# Patient Record
Sex: FEMALE | Race: WHITE | NOT HISPANIC OR LATINO | URBAN - METROPOLITAN AREA
[De-identification: names, ages, dates, MRNs, and addresses within clinical notes are randomized per-mention and may not be internally consistent; named-entity substitution may affect disease eponyms.]

---

## 2020-03-20 ENCOUNTER — IMPORTED ENCOUNTER (OUTPATIENT)
Dept: URBAN - METROPOLITAN AREA CLINIC 50 | Facility: CLINIC | Age: 25
End: 2020-03-20

## 2021-01-06 ENCOUNTER — IMPORTED ENCOUNTER (OUTPATIENT)
Dept: URBAN - METROPOLITAN AREA CLINIC 50 | Facility: CLINIC | Age: 26
End: 2021-01-06

## 2021-04-17 ASSESSMENT — VISUAL ACUITY
OS_CC: 20/25
OD_CC: 20/20-1

## 2021-04-17 ASSESSMENT — TONOMETRY
OD_IOP_MMHG: 18
OS_IOP_MMHG: 16

## 2021-06-02 ENCOUNTER — PREPPED CHART (OUTPATIENT)
Dept: URBAN - METROPOLITAN AREA CLINIC 50 | Facility: CLINIC | Age: 26
End: 2021-06-02

## 2021-06-02 ASSESSMENT — VISUAL ACUITY
OD_SC: 20/300
OD_CC: 20/20
OS_SC: 20/150
OS_CC: 20/25

## 2021-06-03 ENCOUNTER — COMPREHENSIVE EXAM (OUTPATIENT)
Dept: URBAN - METROPOLITAN AREA CLINIC 50 | Facility: CLINIC | Age: 26
End: 2021-06-03

## 2021-06-03 DIAGNOSIS — H52.13: ICD-10-CM

## 2021-06-03 PROCEDURE — 92015 DETERMINE REFRACTIVE STATE: CPT

## 2021-06-03 PROCEDURE — 92012 INTRM OPH EXAM EST PATIENT: CPT

## 2021-06-03 ASSESSMENT — VISUAL ACUITY
OS_PH: 20/25
OU_CC: 20/25
OU_CC: J1+@14IN
OD_CC: 20/25
OS_CC: J1+14IN
OS_CC: 20/40
OD_CC: J1+@14IN

## 2021-06-03 ASSESSMENT — KERATOMETRY
OD_K1POWER_DIOPTERS: 45.50
OD_K2POWER_DIOPTERS: 43.50
OD_AXISANGLE_DEGREES: 97
OS_K2POWER_DIOPTERS: 43.00
OD_AXISANGLE2_DEGREES: 007
OS_AXISANGLE_DEGREES: 85
OS_K1POWER_DIOPTERS: 45.50
OS_AXISANGLE2_DEGREES: 175

## 2021-06-03 ASSESSMENT — TONOMETRY
OS_IOP_MMHG: 14
OD_IOP_MMHG: 15

## 2021-06-03 NOTE — PATIENT DISCUSSION
Myopia, OU. Patient is needing a new glasses Rx. Final Rx given for glasses. If patient can not handle the amount given to her, she is to call and let us know.

## 2021-06-03 NOTE — PATIENT DISCUSSION
We discussed the option of contact lenses. Advised patient to go home and practicing touching her eyes and  watch videos on insertion and removal. Went over the different types of lenses with patient. Recommend daily lenses for patient. Patient will return for an in-office contact lens trial, fitting and instruction.